# Patient Record
Sex: FEMALE | Race: BLACK OR AFRICAN AMERICAN | NOT HISPANIC OR LATINO | Employment: FULL TIME | URBAN - METROPOLITAN AREA
[De-identification: names, ages, dates, MRNs, and addresses within clinical notes are randomized per-mention and may not be internally consistent; named-entity substitution may affect disease eponyms.]

---

## 2022-03-21 ENCOUNTER — OFFICE VISIT (OUTPATIENT)
Dept: PODIATRY | Facility: CLINIC | Age: 62
End: 2022-03-21
Payer: COMMERCIAL

## 2022-03-21 VITALS
BODY MASS INDEX: 28.17 KG/M2 | SYSTOLIC BLOOD PRESSURE: 144 MMHG | DIASTOLIC BLOOD PRESSURE: 74 MMHG | HEIGHT: 64 IN | WEIGHT: 165 LBS | HEART RATE: 70 BPM

## 2022-03-21 DIAGNOSIS — B35.1 ONYCHOMYCOSIS DUE TO DERMATOPHYTE: ICD-10-CM

## 2022-03-21 DIAGNOSIS — L84 CORN OR CALLUS: ICD-10-CM

## 2022-03-21 DIAGNOSIS — M20.42 HAMMER TOES OF BOTH FEET: Primary | ICD-10-CM

## 2022-03-21 DIAGNOSIS — M20.41 HAMMER TOES OF BOTH FEET: Primary | ICD-10-CM

## 2022-03-21 PROCEDURE — 99203 OFFICE O/P NEW LOW 30 MIN: CPT | Mod: S$GLB,,, | Performed by: PODIATRIST

## 2022-03-21 PROCEDURE — 99203 PR OFFICE/OUTPT VISIT, NEW, LEVL III, 30-44 MIN: ICD-10-PCS | Mod: S$GLB,,, | Performed by: PODIATRIST

## 2022-03-21 RX ORDER — PREGABALIN 50 MG/1
50 CAPSULE ORAL NIGHTLY
COMMUNITY
Start: 2022-01-10

## 2022-03-21 RX ORDER — TRAMADOL HYDROCHLORIDE 50 MG/1
50 TABLET ORAL EVERY 6 HOURS PRN
COMMUNITY
Start: 2022-02-21

## 2022-03-21 RX ORDER — BACLOFEN 10 MG/1
10 TABLET ORAL DAILY
COMMUNITY
Start: 2021-09-29

## 2022-03-21 RX ORDER — VALACYCLOVIR HYDROCHLORIDE 500 MG/1
500 TABLET, FILM COATED ORAL DAILY
COMMUNITY
Start: 2021-12-26

## 2022-03-21 RX ORDER — CELECOXIB 200 MG/1
200 CAPSULE ORAL 2 TIMES DAILY
COMMUNITY
Start: 2022-03-02

## 2022-03-21 RX ORDER — LATANOPROST 50 UG/ML
1 SOLUTION/ DROPS OPHTHALMIC NIGHTLY
COMMUNITY
Start: 2022-02-17

## 2022-03-21 RX ORDER — MELOXICAM 15 MG/1
15 TABLET ORAL DAILY
COMMUNITY
Start: 2021-09-29

## 2022-03-23 PROBLEM — G89.29 CHRONIC LEFT-SIDED LOW BACK PAIN WITH LEFT-SIDED SCIATICA: Status: ACTIVE | Noted: 2021-01-13

## 2022-03-23 PROBLEM — M54.42 CHRONIC LEFT-SIDED LOW BACK PAIN WITH LEFT-SIDED SCIATICA: Status: ACTIVE | Noted: 2021-01-13

## 2022-03-23 PROBLEM — K44.9 HIATAL HERNIA WITH GERD: Status: ACTIVE | Noted: 2021-01-13

## 2022-03-23 PROBLEM — K21.9 HIATAL HERNIA WITH GERD: Status: ACTIVE | Noted: 2021-01-13

## 2022-03-23 NOTE — PROGRESS NOTES
Subjective:       Patient ID: Xuan Davis is a 62 y.o. female.    Chief Complaint: Foot Pain    Patient presents to [facility] with chief complaint of pain and tenderness from bilateral feet with prolonged standing.  Patient also has concerns about a very thickened discolored right 1st digit nail plate.  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  Patient reports that she has previously trimmed these areas but they have grown back and are now painful.  Patient reports pain 4/10 today. Patient denies any recent trauma.    No past medical history on file.  History reviewed. No pertinent surgical history.  History reviewed. No pertinent family history.  Social History     Socioeconomic History    Marital status:    Substance and Sexual Activity    Alcohol use: Not Currently       Current Outpatient Medications   Medication Sig Dispense Refill    latanoprost 0.005 % ophthalmic solution Place 1 drop into both eyes nightly.      valACYclovir (VALTREX) 500 MG tablet Take 500 mg by mouth once daily.      baclofen (LIORESAL) 10 MG tablet Take 10 mg by mouth once daily.      celecoxib (CELEBREX) 200 MG capsule Take 200 mg by mouth 2 (two) times daily.      meloxicam (MOBIC) 15 MG tablet Take 15 mg by mouth once daily.      pregabalin (LYRICA) 50 MG capsule Take 50 mg by mouth nightly.      traMADoL (ULTRAM) 50 mg tablet Take 50 mg by mouth every 6 (six) hours as needed.       No current facility-administered medications for this visit.     Review of patient's allergies indicates:   Allergen Reactions    Oxycodone-acetaminophen      Other reaction(s): Altered Mental Status       Review of Systems   Constitutional: Negative for chills and fever.   Gastrointestinal: Negative for diarrhea, nausea and vomiting.       Objective:      Vitals:    03/21/22 1315   BP: (!) 144/74   Pulse: 70   Weight: 74.8  "kg (165 lb)   Height: 5' 4" (1.626 m)     Physical Exam  Vitals reviewed.   Constitutional:       General: She is not in acute distress.     Appearance: Normal appearance. She is not ill-appearing.   HENT:      Head: Normocephalic.      Nose: Nose normal.   Cardiovascular:      Pulses:           Dorsalis pedis pulses are 2+ on the right side and 2+ on the left side.        Posterior tibial pulses are 2+ on the right side and 2+ on the left side.   Musculoskeletal:      Right foot: Deformity (Hammertoe 2-5 digits) present.      Left foot: Deformity: hammertoes 2,3,4 and 5 digits.   Feet:      Right foot:      Skin integrity: Callus (dorsal PIPJ 5th digit right foot) present.      Toenail Condition: Right toenails are abnormally thick and long. Fungal disease present.     Left foot:      Toenail Condition: Left toenails are abnormally thick and long. Fungal disease present.  Skin:     General: Skin is warm.      Capillary Refill: Capillary refill takes 2 to 3 seconds.      Findings: Lesion (plantar 3rd MPJ bilateral foot) present.   Neurological:      Mental Status: She is alert and oriented to person, place, and time.   Psychiatric:         Mood and Affect: Mood normal.         Behavior: Behavior normal.         Thought Content: Thought content normal.       Pedal Examination:  Neuro:  Protective and light touch sensation intact to the bilateral foot, no paresthesias noted bilateral foot  Vasc:  DP and PT pulses palpable 2/4 bilateral foot, no edema noted to the bilateral foot or ankle, skin temperature gradient within normal limits from proximal to distal bilateral foot, capillary fill time less than 3 seconds to the distal aspect of the digits bilateral foot  Musc:  5/5 muscle strength noted to the bilateral foot no masses noted to the bilateral foot, contracted digits bilateral foot right 5th digit with adductovarus rotation, pain upon palpation of the plantar aspect bilateral foot toe towards the 3rd MPJ  Derm:  " Nails 1 through 5 bilateral are thickened and elongated and discolored, right 1st digit nail plate is specifically more thickened and discolored from than the other digits, no open lesions noted bilateral foot, planter hyperkeratotic soft tissue lesions noted to the plantar 3rd MPJ region bilateral foot, small hyperkeratotic skin lesion noted to the dorsal aspect of the right 5th digit PIP joint, no rashes noted bilateral foot           Assessment:       1. Hammer toes of both feet    2. Corn or callus    3. Onychomycosis due to dermatophyte        Plan:       1. Patient was examined and evaluated  2. Discussed etiology of hyperkeratotic lesions with patient and possible treatments- non-sharp debridement at home with a nail file or emery board, aggressive moisturization of the lesions, offloading pads in the shoe gear or directly to feet, increased toe box height and width of the shoe gear.  Patient was dispensed multiple forms of offloading pad for home usage.  3. Discussed etiology of dystrophic versus fungal nails.  Also discussed topical over-the-counter treatments for nail fungus.  Discussed possible oral Lamisil but patient is apprehensive secondary to side effect profile  4. Discussed use of comfortable shoe gear at work and at home  5. Discussed etiology of hammertoe deformities and possible conservative treatment options also discussed surgical intervention if pain and symptoms do not improve  6. Patient will follow-up in 3 months or p.r.n. for complaints

## 2022-03-28 DIAGNOSIS — B35.3 TINEA PEDIS OF BOTH FEET: Primary | ICD-10-CM

## 2022-03-28 RX ORDER — CICLOPIROX OLAMINE 7.7 MG/G
CREAM TOPICAL 2 TIMES DAILY
Qty: 90 G | Refills: 1 | Status: SHIPPED | OUTPATIENT
Start: 2022-03-28 | End: 2022-04-27

## 2023-05-16 ENCOUNTER — OFFICE VISIT (OUTPATIENT)
Dept: OBSTETRICS AND GYNECOLOGY | Facility: CLINIC | Age: 63
End: 2023-05-16
Payer: COMMERCIAL

## 2023-05-16 VITALS
HEIGHT: 64 IN | BODY MASS INDEX: 29.64 KG/M2 | WEIGHT: 173.63 LBS | SYSTOLIC BLOOD PRESSURE: 130 MMHG | DIASTOLIC BLOOD PRESSURE: 86 MMHG

## 2023-05-16 DIAGNOSIS — Z01.419 WOMEN'S ANNUAL ROUTINE GYNECOLOGICAL EXAMINATION: Primary | ICD-10-CM

## 2023-05-16 PROCEDURE — 87624 HPV HI-RISK TYP POOLED RSLT: CPT | Performed by: OBSTETRICS & GYNECOLOGY

## 2023-05-16 PROCEDURE — 99387 PR PREVENTIVE VISIT,NEW,65 & OVER: ICD-10-PCS | Mod: S$GLB,,, | Performed by: OBSTETRICS & GYNECOLOGY

## 2023-05-16 PROCEDURE — 88175 CYTOPATH C/V AUTO FLUID REDO: CPT | Performed by: OBSTETRICS & GYNECOLOGY

## 2023-05-16 PROCEDURE — 99387 INIT PM E/M NEW PAT 65+ YRS: CPT | Mod: S$GLB,,, | Performed by: OBSTETRICS & GYNECOLOGY

## 2023-05-16 NOTE — PROGRESS NOTES
"Annual Well Woman's Exam  History & Physical      SUBJECTIVE:     History of Present Illness:  Patient is a 63 y.o. female presents for her annual exam. She has no complaints today.     Chief Complaint   Patient presents with    Well Woman       Review of patient's allergies indicates:   Allergen Reactions    Oxycodone-acetaminophen      Other reaction(s): Altered Mental Status       Current Outpatient Medications   Medication Sig Dispense Refill    latanoprost 0.005 % ophthalmic solution Place 1 drop into both eyes nightly.      valACYclovir (VALTREX) 500 MG tablet Take 500 mg by mouth once daily.      baclofen (LIORESAL) 10 MG tablet Take 10 mg by mouth once daily.      celecoxib (CELEBREX) 200 MG capsule Take 200 mg by mouth 2 (two) times daily.      ciclopirox (LOPROX) 0.77 % Crea Apply topically 2 (two) times daily. 90 g 1    meloxicam (MOBIC) 15 MG tablet Take 15 mg by mouth once daily.      pregabalin (LYRICA) 50 MG capsule Take 50 mg by mouth nightly.      traMADoL (ULTRAM) 50 mg tablet Take 50 mg by mouth every 6 (six) hours as needed.       No current facility-administered medications for this visit.     OB History          6    Para        Term                AB        Living   4         SAB        IAB        Ectopic        Multiple        Live Births                 No LMP recorded. Patient is postmenopausal.      Past Medical History:   Diagnosis Date    Shingles      History reviewed. No pertinent surgical history.  History reviewed. No pertinent family history.  Social History     Tobacco Use    Smoking status: Never    Smokeless tobacco: Never   Substance Use Topics    Alcohol use: Not Currently    Drug use: Never        OBJECTIVE:     Vital Signs (Most Recent)  BP: 130/86 (23 1036)  5' 4" (1.626 m)  78.7 kg (173 lb 9.6 oz)     Physical Exam:  Physical Exam  Vitals reviewed.   Constitutional:       Appearance: Normal appearance.   HENT:      Head: Normocephalic.   Neck:      " Thyroid: No thyroid mass, thyromegaly or thyroid tenderness.   Pulmonary:      Effort: Pulmonary effort is normal.   Chest:   Breasts:     Right: Normal.      Left: Normal.   Abdominal:      General: Abdomen is flat.      Palpations: Abdomen is soft.   Genitourinary:     General: Normal vulva.      Vagina: Normal.      Cervix: Normal.      Uterus: Normal.       Adnexa: Right adnexa normal and left adnexa normal.   Lymphadenopathy:      Upper Body:      Right upper body: No axillary adenopathy.      Left upper body: No axillary adenopathy.   Skin:     General: Skin is warm and dry.   Neurological:      General: No focal deficit present.      Mental Status: She is alert and oriented to person, place, and time.   Psychiatric:         Mood and Affect: Mood normal.         Behavior: Behavior normal.         ASSESSMENT/PLAN:       ICD-10-CM ICD-9-CM   1. Women's annual routine gynecological examination  Z01.419 V72.31       PLAN:    --Pap with cotesting today  --Pt declines mammogram screening. Counseled her on the importance of screening for breast cancer.   --Colonoscopy is up to date. Performed in December 2022.   --DXA scan ordered  --Follow up in 1 year for annual exam or sooner as needed    Janeth Todd MD   Gynecology    [address]  [address]  [place] [address] [phone]

## 2023-05-21 LAB
FINAL PATHOLOGIC DIAGNOSIS: NORMAL
Lab: NORMAL

## 2023-05-23 LAB
HPV HR 12 DNA SPEC QL NAA+PROBE: NEGATIVE
HPV16 AG SPEC QL: NEGATIVE
HPV18 DNA SPEC QL NAA+PROBE: NEGATIVE